# Patient Record
Sex: MALE | Race: WHITE | NOT HISPANIC OR LATINO
[De-identification: names, ages, dates, MRNs, and addresses within clinical notes are randomized per-mention and may not be internally consistent; named-entity substitution may affect disease eponyms.]

---

## 2017-01-03 ENCOUNTER — APPOINTMENT (OUTPATIENT)
Dept: ORTHOPEDIC SURGERY | Facility: CLINIC | Age: 72
End: 2017-01-03

## 2017-01-03 ENCOUNTER — OTHER (OUTPATIENT)
Age: 72
End: 2017-01-03

## 2017-01-03 VITALS
SYSTOLIC BLOOD PRESSURE: 121 MMHG | HEART RATE: 79 BPM | HEIGHT: 72 IN | BODY MASS INDEX: 25.87 KG/M2 | WEIGHT: 191 LBS | DIASTOLIC BLOOD PRESSURE: 73 MMHG

## 2017-01-03 DIAGNOSIS — M77.11 LATERAL EPICONDYLITIS, RIGHT ELBOW: ICD-10-CM

## 2017-01-06 RX ORDER — METHYLPRED ACET/NACL,ISO-OS/PF 40 MG/ML
40 VIAL (ML) INJECTION
Qty: 1 | Refills: 0 | Status: COMPLETED | OUTPATIENT
Start: 2017-01-06

## 2017-01-06 RX ORDER — LIDOCAINE HYDROCHLORIDE 10 MG/ML
1 INJECTION, SOLUTION INFILTRATION; PERINEURAL
Refills: 0 | Status: COMPLETED | OUTPATIENT
Start: 2017-01-06

## 2017-01-06 RX ADMIN — METHYLPREDNISOLONE ACETATE MG/ML: 40 INJECTION, SUSPENSION INTRA-ARTICULAR; INTRALESIONAL; INTRAMUSCULAR; SOFT TISSUE at 00:00

## 2017-01-06 RX ADMIN — LIDOCAINE HYDROCHLORIDE %: 10 INJECTION, SOLUTION INFILTRATION; PERINEURAL at 00:00

## 2017-06-12 ENCOUNTER — APPOINTMENT (OUTPATIENT)
Dept: ORTHOPEDIC SURGERY | Facility: CLINIC | Age: 72
End: 2017-06-12
Payer: MEDICARE

## 2017-06-12 VITALS
DIASTOLIC BLOOD PRESSURE: 62 MMHG | HEART RATE: 52 BPM | HEIGHT: 72 IN | WEIGHT: 197 LBS | SYSTOLIC BLOOD PRESSURE: 102 MMHG | BODY MASS INDEX: 26.68 KG/M2

## 2017-06-12 DIAGNOSIS — M54.5 LOW BACK PAIN: ICD-10-CM

## 2017-06-12 PROCEDURE — 96372 THER/PROPH/DIAG INJ SC/IM: CPT

## 2017-06-12 PROCEDURE — 72170 X-RAY EXAM OF PELVIS: CPT | Mod: 59

## 2017-06-12 PROCEDURE — 72114 X-RAY EXAM L-S SPINE BENDING: CPT

## 2017-06-12 PROCEDURE — 99214 OFFICE O/P EST MOD 30 MIN: CPT | Mod: 25

## 2017-06-12 RX ORDER — NAPROXEN SODIUM 220 MG
TABLET ORAL
Refills: 0 | Status: ACTIVE | COMMUNITY

## 2017-06-12 RX ADMIN — KETOROLAC TROMETHAMINE 0 MG/ML: 30 INJECTION, SOLUTION INTRAMUSCULAR; INTRAVENOUS at 00:00

## 2017-06-12 NOTE — HISTORY OF PRESENT ILLNESS
[Pain] : pain [Improving] : improving [7] : currently ~his/her~ pain is 7 out of 10 [Constant] : ~He/She~ states the symptoms seem to be constant [Joint Pain] : joint pain [Joint Stiffness] : joint stiffness [Joint Swelling] : joint swelling [Muscle Aches] : muscle aches [___ wks] : [unfilled] week(s) ago [All Other ROS Normal] : All other review of systems are negative except as noted [All Hx] : past medical, family, and social [All] : medication and allergy [FreeTextEntry1] : low back pain [FreeTextEntry2] : Pt presents here today for evaluation of low back pain ongoing x 10+ few years, worsening x 1 week. Pt reports no known injury. pt states pain suddenly worsened and radiates into left buttocks and hip. pt denies radiation down bilateral legs. Denies numbness, tingling, or weakness. Reports stiffness and pain to low back initially 9/10, currently 6-7/10. Taking aleve and motrin prn for relief. Stopped all meds for this 2 days ago.\par Active person - boater, fisherman, golfs. \par Current episode started a week ago when standing on his boat. Had golfed a few days prior. 40-50% better.\par Walks, occasional gym, stretches every morning.

## 2017-06-12 NOTE — PHYSICAL EXAM
[Stooped] : stooped [Limited] : is limited [Painful] : is painful [LE] : Sensory: Intact in bilateral lower extremities [1+] : left patella 1+ [2+] : left ankle jerk 2+ [DP] : dorsalis pedis 2+ and symmetric bilaterally [PT] : posterior tibial 2+ and symmetric bilaterally [Acute Distress] : not in acute distress [Obese] : not obese [Abl Mood] : in a normal mood [Abl Affect] : with normal affect [Poor Coordination] : normal coordination [Disorientation] : oriented x 3 [SLR] : negative straight leg raise [Plantar Reflex Right Only] : absent on the right [Plantar Reflex Left Only] : absent on the left [DTR Reflexes Clonus Of Right Ankle (___ Beats)] : absent on the right [DTR Reflexes Clonus Of Left Ankle (___ Beats)] : absent on the left [FreeTextEntry2] : The pt is awake, alert and oriented to self, place and time, is uncomfortable but in no acute distress. Gait examination reveals a narrow based, non-ataxic, non-antalgic gait. Can heel and toe walk without difficulty. Inspection of neck, back and lower extremities bilaterally reveals no rashes or ecchymotic lesions.  There is no obvious abnormal spinal curvature in the sagittal and coronal planes. There is no tenderness over the cervical, thoracic spine, or the upper and lower extremities musculature. Midline lumbar and paraspinal tenderness in the lumbar spine. There is no sacroiliac tenderness. No greater trochanteric tenderness bilaterally. No atrophy or abnormal movements noted in the upper or lower extremities. There is no swelling noted in the upper or lower extremities bilaterally. No cervical lymphadenopathy noted anteriorly. No joint laxity noted in the upper and lower extremity joints bilaterally.\par Hip range of motion is degrees internal rotation 30° external rotation without pain. Full range of motion of the shoulders bilaterally with no significant pain\par Negative straight leg raise to 45° in the sitting position bilaterally. There is no groin pain with hip internal rotation and a negative YUE test bilaterally.  [de-identified] : extension to 30 degrees with pain, flexion to mid tibia [de-identified] : 4 views lumbar spine obtained today demonstrate no significant scoliosis. Normal lumbar lordosis degenerative changes are seen at the thoracolumbar junction at the T12-L1 L1-2 levels. No acute fractures. No dynamic instability between flexion and extension.\par \par AP pelvis demonstrates normal appearance of the hips bilaterally. No significant degenerative changes.

## 2017-06-12 NOTE — CONSULT LETTER
[Dear  ___] : Dear  [unfilled], [I had the pleasure of evaluating your patient, [unfilled].] : I had the pleasure of evaluating your patient, [unfilled]. [FreeTextEntry2] : Sebastien Saez [FreeTextEntry1] : Thank you for this referral. I have enclosed my note for your review. Please feel free to contact my office if you have additional questions regarding this patient.\par \par Regards,\par Bo Richards MD, FACS, FAAOS\par \par  of Orthopaedic Surgery\par [facility] [employer]\par Spinal Reconstruction Surgery\par Minimally Invasive Spinal Surgery\par [facility]

## 2017-06-12 NOTE — DISCUSSION/SUMMARY
[Medication Risks Reviewed] : Medication risks reviewed [de-identified] : The patient reports onset of low back pain bilaterally with some radiation into his left buttock. He has denied having severe pain like this in the past. His symptoms are suggestive of acute lumbar spasm associated with annular tear and inflammation of his lumbar spine. Mild degenerative changes are noted on x-rays. He is an active golfer and fisherman. I recommended strongly that he start a course of physical therapy as well as bursula self-directed home exercise program or exercise at the gym with swimming and yoga. Prescribed him tizanidine as a muscle relaxant for symptomatic relief. I will see him back in to 4 weeks an as-needed basis for his symptoms. If his symptoms persist additional imaging studies including an MRI can be considered.\par \par The patient has reported significant pain and would like an injection of Toradol today. Under sterile conditions 30 mg of Toradol was administered intramuscularly by the LPN with the response. \par \par The patient was educated regarding their condition, treatment options as well as prescribed course of treatment. \par Risks and benefits as well as alternatives to the proposed treatment were also provided to the patient \par They were given the opportunity to have all their questions answered to their satisfaction.\par \par Vital signs were reviewed with the patient and the patient was instructed to followup with their primary care provider for further management.\par \par Healthy lifestyle recommendations were also made including a tobacco free lifestyle, proper diet, and weight control.

## 2018-12-12 ENCOUNTER — APPOINTMENT (OUTPATIENT)
Dept: ORTHOPEDIC SURGERY | Facility: CLINIC | Age: 73
End: 2018-12-12
Payer: MEDICARE

## 2018-12-12 VITALS
DIASTOLIC BLOOD PRESSURE: 70 MMHG | WEIGHT: 198 LBS | HEIGHT: 72 IN | HEART RATE: 56 BPM | BODY MASS INDEX: 26.82 KG/M2 | SYSTOLIC BLOOD PRESSURE: 110 MMHG

## 2018-12-12 PROCEDURE — 73564 X-RAY EXAM KNEE 4 OR MORE: CPT | Mod: RT

## 2018-12-12 PROCEDURE — 99215 OFFICE O/P EST HI 40 MIN: CPT | Mod: 25

## 2018-12-12 PROCEDURE — 20610 DRAIN/INJ JOINT/BURSA W/O US: CPT | Mod: RT

## 2018-12-28 RX ORDER — LIDOCAINE HYDROCHLORIDE 10 MG/ML
1 INJECTION, SOLUTION INFILTRATION; PERINEURAL
Qty: 0 | Refills: 0 | Status: COMPLETED | OUTPATIENT
Start: 2018-12-28

## 2018-12-28 RX ORDER — METHYLPRED ACET/NACL,ISO-OS/PF 40 MG/ML
40 VIAL (ML) INJECTION
Qty: 1 | Refills: 0 | Status: COMPLETED | OUTPATIENT
Start: 2018-12-28

## 2018-12-28 RX ADMIN — LIDOCAINE HYDROCHLORIDE %: 10 INJECTION, SOLUTION INFILTRATION; PERINEURAL at 00:00

## 2018-12-28 RX ADMIN — Medication MG/ML: at 00:00

## 2019-06-17 ENCOUNTER — APPOINTMENT (OUTPATIENT)
Dept: ORTHOPEDIC SURGERY | Facility: CLINIC | Age: 74
End: 2019-06-17
Payer: MEDICARE

## 2019-06-17 VITALS
DIASTOLIC BLOOD PRESSURE: 68 MMHG | HEIGHT: 72 IN | HEART RATE: 45 BPM | BODY MASS INDEX: 24.65 KG/M2 | WEIGHT: 182 LBS | SYSTOLIC BLOOD PRESSURE: 113 MMHG

## 2019-06-17 PROCEDURE — 72170 X-RAY EXAM OF PELVIS: CPT | Mod: 59

## 2019-06-17 PROCEDURE — 72110 X-RAY EXAM L-2 SPINE 4/>VWS: CPT

## 2019-06-17 PROCEDURE — 99214 OFFICE O/P EST MOD 30 MIN: CPT

## 2019-06-17 RX ORDER — IBUPROFEN 800 MG/1
TABLET, FILM COATED ORAL
Refills: 0 | Status: DISCONTINUED | COMMUNITY
End: 2019-06-17

## 2019-06-17 RX ORDER — TIZANIDINE 4 MG/1
4 TABLET ORAL 3 TIMES DAILY
Qty: 50 | Refills: 0 | Status: DISCONTINUED | COMMUNITY
Start: 2017-06-12 | End: 2019-06-17

## 2019-06-17 NOTE — PHYSICAL EXAM
[Painful] : is painful [Limited] : is limited [LE] : Sensory: Intact in bilateral lower extremities [1+] : left patella 1+ [2+] : right ankle jerk 2+ [DP] : dorsalis pedis 2+ and symmetric bilaterally [PT] : posterior tibial 2+ and symmetric bilaterally [Acute Distress] : not in acute distress [Obese] : not obese [Abl Affect] : with normal affect [Abl Mood] : in a normal mood [Poor Coordination] : normal coordination [Disorientation] : oriented x 3 [SLR] : negative straight leg raise [Plantar Reflex Right Only] : absent on the right [DTR Reflexes Clonus Of Left Ankle (___ Beats)] : absent on the left [Plantar Reflex Left Only] : absent on the left [DTR Reflexes Clonus Of Right Ankle (___ Beats)] : absent on the right [FreeTextEntry2] : The pt is awake, alert and oriented to self, place and time, is uncomfortable but in no acute distress. Gait examination reveals a narrow based, non-ataxic, non-antalgic gait. Can heel and toe walk without difficulty. Inspection of neck, back and lower extremities bilaterally reveals no rashes or ecchymotic lesions.  There is no obvious abnormal spinal curvature in the sagittal and coronal planes. There is no tenderness over the cervical, thoracic spine, or the upper and lower extremities musculature. Midline lumbar and paraspinal tenderness in the lumbar spine. There is no sacroiliac tenderness. No greater trochanteric tenderness bilaterally. No atrophy or abnormal movements noted in the upper or lower extremities. There is no swelling noted in the upper or lower extremities bilaterally. No cervical lymphadenopathy noted anteriorly. No joint laxity noted in the upper and lower extremity joints bilaterally.\par Hip range of motion is degrees internal rotation 30° external rotation without pain. Full range of motion of the shoulders bilaterally with no significant pain\par Negative straight leg raise to 45° in the sitting position bilaterally. There is no groin pain with hip internal rotation and a negative YUE test bilaterally.  [de-identified] : extension to 30 degrees with pain, flexion to mid tibia [de-identified] : left sciatic notch tenderness [de-identified] : 4 views lumbar spine obtained today demonstrate no significant scoliosis. Normal lumbar lordosis degenerative changes are seen at the thoracolumbar junction at the T12-L1 L1-2 levels. No acute fractures. No dynamic instability between flexion and extension.\par \par AP pelvis demonstrates normal appearance of the hips bilaterally. No significant degenerative changes.

## 2019-06-17 NOTE — HISTORY OF PRESENT ILLNESS
[Worsening] : worsening [8] : a current pain level of 8/10 [None] : No relieving factors are noted [Daily] : ~He/She~ states the symptoms seem to be occuring daily [___ mths] : [unfilled] month(s) ago [de-identified] : Patient is here today for evaluation on his left buttock into left hip pain going on for the past several months. Patient states he is a side sleeper and is unable to lie on his left buttock to sleep any longer without having pain. ~3 months.\par Plays tennis, pickle ball and golf.  [de-identified] : sleeping lifting left leg

## 2019-06-17 NOTE — DISCUSSION/SUMMARY
[Medication Risks Reviewed] : Medication risks reviewed [de-identified] : The patient has symptoms consistent with lumbar radiculopathy. He has known degenerative changes in lumbar spine. I have recommended an MRI of her lumbar spine for further evaluation. Further treatment options may include lumbar epidural steroid injections based on MRI findings.Trial of gabapentin prescribed.\par \par The patient was educated regarding their condition, treatment options as well as prescribed course of treatment. \par Risks and benefits as well as alternatives to the proposed treatment were also provided to the patient \par They were given the opportunity to have all their questions answered to their satisfaction.\par \par Vital signs were reviewed with the patient and the patient was instructed to followup with their primary care provider for further management.\par \par Healthy lifestyle recommendations were also made including a tobacco free lifestyle, proper diet, and weight control.

## 2019-06-18 ENCOUNTER — APPOINTMENT (OUTPATIENT)
Dept: MRI IMAGING | Facility: CLINIC | Age: 74
End: 2019-06-18
Payer: MEDICARE

## 2019-06-18 ENCOUNTER — OUTPATIENT (OUTPATIENT)
Dept: OUTPATIENT SERVICES | Facility: HOSPITAL | Age: 74
LOS: 1 days | End: 2019-06-18
Payer: MEDICARE

## 2019-06-18 DIAGNOSIS — M51.36 OTHER INTERVERTEBRAL DISC DEGENERATION, LUMBAR REGION: ICD-10-CM

## 2019-06-18 DIAGNOSIS — M54.16 RADICULOPATHY, LUMBAR REGION: ICD-10-CM

## 2019-06-18 DIAGNOSIS — M51.26 OTHER INTERVERTEBRAL DISC DISPLACEMENT, LUMBAR REGION: ICD-10-CM

## 2019-06-18 DIAGNOSIS — M51.37 OTHER INTERVERTEBRAL DISC DEGENERATION, LUMBOSACRAL REGION: ICD-10-CM

## 2019-06-18 PROCEDURE — 72148 MRI LUMBAR SPINE W/O DYE: CPT

## 2019-06-18 PROCEDURE — 72148 MRI LUMBAR SPINE W/O DYE: CPT | Mod: 26

## 2019-06-26 ENCOUNTER — APPOINTMENT (OUTPATIENT)
Dept: ORTHOPEDIC SURGERY | Facility: CLINIC | Age: 74
End: 2019-06-26
Payer: MEDICARE

## 2019-06-26 VITALS
HEART RATE: 60 BPM | HEIGHT: 72 IN | BODY MASS INDEX: 24.52 KG/M2 | SYSTOLIC BLOOD PRESSURE: 113 MMHG | WEIGHT: 181 LBS | DIASTOLIC BLOOD PRESSURE: 74 MMHG

## 2019-06-26 DIAGNOSIS — M51.26 OTHER INTERVERTEBRAL DISC DISPLACEMENT, LUMBAR REGION: ICD-10-CM

## 2019-06-26 DIAGNOSIS — M51.37 OTHER INTERVERTEBRAL DISC DEGENERATION, LUMBOSACRAL REGION: ICD-10-CM

## 2019-06-26 PROCEDURE — 99214 OFFICE O/P EST MOD 30 MIN: CPT

## 2019-06-26 NOTE — PHYSICAL EXAM
[Limited] : is limited [Painful] : is painful [LE] : Sensory: Intact in bilateral lower extremities [1+] : left patella 1+ [2+] : left ankle jerk 2+ [DP] : dorsalis pedis 2+ and symmetric bilaterally [PT] : posterior tibial 2+ and symmetric bilaterally [Acute Distress] : not in acute distress [de-identified] : EXAM: MR SPINE LUMBAR \par \par PROCEDURE DATE: 06/18/2019 \par \par INTERPRETATION: CLINICAL INDICATION: Left leg pain. \par \par Multiplanar Multisequence MR of the LUMBAR SPINE \par \par Prior Studies: None. \par \par FINDINGS: \par \par ALIGNMENT: There is exaggeration of lumbar lordosis. There is mild \par retrolisthesis of L1 on L2. There is mild levoscoliosis. \par \par VERTEBRAL BODIES: No acute compression deformity. \par \par DISC SPACES: There is multilevel disc desiccation and multilevel moderate \par disc height loss. Vacuum disc phenomenon is seen at T11/T12, T12/L1, and \par L1/L2. Chronic Schmorl's node formation is noted at multiple levels. \par \par MARROW: Prominent edematous endplate changes are seen at L4/L5 and to a \par lesser extent L2/L3, L1/L2, T12/L1, and T11/T12. \par \par SACROILIAC JOINTS: There is mild bilateral sacroiliac joint arthrosis. \par \par CONUS AND CAUDA EQUINA: Conus is normal in morphology terminating at the \par level of L1 \par \par IMAGED ABDOMINAL AND PELVIC STRUCTURES: Partially imaged left-sided renal \par cysts are noted. \par \par The findings at the individual levels are as follows: \par \par T12/L1: This level is imaged only in the sagittal plane. There is a minimal \par disc bulge. There is no spinal canal or neural foraminal narrowing. \par \par L1/2: There is a minimal disc bulge with posterior osseous ridging. There is \par mild bilateral neural foraminal narrowing. There is mild flattening of \par ventral thecal sac. \par \par L2/3: There is a mild diffuse disc bulge. There is bilateral facet arthrosis \par and ligamentum flavum hypertrophy. There is a small right facet joint \par effusion with fluid decompressing into the right posterior paraspinal \par musculature. Findings result in bilateral neural foraminal narrowing, \par moderate on the right and mild on the left. There is mild spinal canal \par narrowing. \par \par L3/4: There is a mild diffuse disc bulge with bilateral facet arthrosis. \par Findings result in mild bilateral neural foraminal narrowing. There is mild \par spinal canal narrowing. \par \par L4/5: There is a diffuse disc bulge with posterior osseous ridging and a \par superimposed left foraminal disc protrusion. There is bilateral facet \par arthrosis. Findings result in mild to moderate spinal canal narrowing, most \par prominent along the lateral recesses bilaterally. There is bilateral neural \par foraminal narrowing, moderate to severe on the left and mild on the right. \par \par L5/S1: There is a diffuse disc bulge with a superimposed central disc \par protrusion. There is bilateral facet arthrosis. There is a small right facet \par joint effusion. Findings result in bilateral neural foraminal narrowing, \par moderate on the left and mild on the right. There is no spinal canal \par narrowing. \par \par IMPRESSION: \par \par Multilevel lumbar spondylosis with exaggeration of the lumbar lordosis and \par trace retrolisthesis of L1 on L2 and L2 on L3. \par \par L2/3: There is moderate right neural foraminal narrowing. \par \par L4/5: There is mild to moderate spinal canal narrowing, most prominent along \par the lateral recesses bilaterally. There is moderate to severe left neural \par foraminal narrowing. \par \par L5/S1: There is moderate left neural foraminal narrowing. \par \par CAREN CASTELLANOS M.D., ATTENDING RADIOLOGIST \par This document has been electronically signed. Jun 20 2019 10:05AM  [Obese] : not obese [Abl Mood] : in a normal mood [Abl Affect] : with normal affect [Disorientation] : oriented x 3 [Poor Coordination] : normal coordination [SLR] : negative straight leg raise [Plantar Reflex Right Only] : absent on the right [Plantar Reflex Left Only] : absent on the left [DTR Reflexes Clonus Of Left Ankle (___ Beats)] : absent on the left [DTR Reflexes Clonus Of Right Ankle (___ Beats)] : absent on the right [FreeTextEntry2] : The pt is awake, alert and oriented to self, place and time, is uncomfortable but in no acute distress. Gait examination reveals a narrow based, non-ataxic, non-antalgic gait. Can heel and toe walk without difficulty. Inspection of neck, back and lower extremities bilaterally reveals no rashes or ecchymotic lesions.  There is no obvious abnormal spinal curvature in the sagittal and coronal planes. There is no tenderness over the cervical, thoracic spine, or the upper and lower extremities musculature. Midline lumbar and paraspinal tenderness in the lumbar spine. There is no sacroiliac tenderness. No greater trochanteric tenderness bilaterally. No atrophy or abnormal movements noted in the upper or lower extremities. There is no swelling noted in the upper or lower extremities bilaterally. No cervical lymphadenopathy noted anteriorly. No joint laxity noted in the upper and lower extremity joints bilaterally.\par Hip range of motion is degrees internal rotation 30° external rotation without pain. Full range of motion of the shoulders bilaterally with no significant pain\par Negative straight leg raise to 45° in the sitting position bilaterally. There is no groin pain with hip internal rotation and a negative YUE test bilaterally.  [de-identified] : left sciatic notch tenderness [de-identified] : extension to 30 degrees with pain, flexion to mid tibia

## 2019-06-26 NOTE — HISTORY OF PRESENT ILLNESS
[Daily] : ~He/She~ states the symptoms seem to be occuring daily [None] : No relieving factors are noted [Stable] : stable [___ mths] : [unfilled] month(s) ago [de-identified] : Patient is here today to review mri lumbar spine 6/18/19. No changes since last office visit 6/17/19 [de-identified] : lifting left leg sleeping

## 2019-06-26 NOTE — DISCUSSION/SUMMARY
[Medication Risks Reviewed] : Medication risks reviewed [de-identified] : The patient has disc protrusion on the left side at L5-S1 into lesser extent L4-5 with symptoms likely corresponding to this pathology. Recommended that he try a course of lumbar epidural steroid injections and he would like to proceed. He will continue gabapentin as tolerated though he is not sure if that helped him very much. He understands if his symptoms do not improve a limited decompression left side L4-5 and L5-S1 with discectomy would be considered.\par \par The patient was educated regarding their condition, treatment options as well as prescribed course of treatment. \par Risks and benefits as well as alternatives to the proposed treatment were also provided to the patient \par They were given the opportunity to have all their questions answered to their satisfaction.\par \par Vital signs were reviewed with the patient and the patient was instructed to followup with their primary care provider for further management.\par \par Healthy lifestyle recommendations were also made including a tobacco free lifestyle, proper diet, and weight control.

## 2019-06-26 NOTE — REASON FOR VISIT
[Follow-Up Visit] : a follow-up visit for [Degenerative Joint Disease] : degenerative joint disease [Herniated Discs] : herniated discs [Back Pain] : back pain [Radiculopathy] : radiculopathy

## 2019-07-05 ENCOUNTER — APPOINTMENT (OUTPATIENT)
Dept: ORTHOPEDIC SURGERY | Facility: HOSPITAL | Age: 74
End: 2019-07-05

## 2019-07-05 ENCOUNTER — OUTPATIENT (OUTPATIENT)
Dept: OUTPATIENT SERVICES | Facility: HOSPITAL | Age: 74
LOS: 1 days | End: 2019-07-05
Payer: MEDICARE

## 2019-07-05 DIAGNOSIS — M54.16 RADICULOPATHY, LUMBAR REGION: ICD-10-CM

## 2019-07-05 DIAGNOSIS — M51.37 OTHER INTERVERTEBRAL DISC DEGENERATION, LUMBOSACRAL REGION: ICD-10-CM

## 2019-07-05 DIAGNOSIS — M51.26 OTHER INTERVERTEBRAL DISC DISPLACEMENT, LUMBAR REGION: ICD-10-CM

## 2019-07-05 PROCEDURE — 64483 NJX AA&/STRD TFRM EPI L/S 1: CPT | Mod: LT

## 2019-07-05 PROCEDURE — 64484 NJX AA&/STRD TFRM EPI L/S EA: CPT | Mod: LT

## 2019-07-05 PROCEDURE — 77003 FLUOROGUIDE FOR SPINE INJECT: CPT

## 2019-07-15 ENCOUNTER — RX RENEWAL (OUTPATIENT)
Age: 74
End: 2019-07-15

## 2019-07-22 ENCOUNTER — APPOINTMENT (OUTPATIENT)
Dept: ORTHOPEDIC SURGERY | Facility: CLINIC | Age: 74
End: 2019-07-22
Payer: MEDICARE

## 2019-07-22 VITALS
HEIGHT: 72 IN | DIASTOLIC BLOOD PRESSURE: 70 MMHG | SYSTOLIC BLOOD PRESSURE: 111 MMHG | BODY MASS INDEX: 24.38 KG/M2 | HEART RATE: 50 BPM | WEIGHT: 180 LBS

## 2019-07-22 DIAGNOSIS — M51.36 OTHER INTERVERTEBRAL DISC DEGENERATION, LUMBAR REGION: ICD-10-CM

## 2019-07-22 DIAGNOSIS — M54.16 RADICULOPATHY, LUMBAR REGION: ICD-10-CM

## 2019-07-22 PROCEDURE — 99214 OFFICE O/P EST MOD 30 MIN: CPT

## 2019-07-22 RX ORDER — CHLORHEXIDINE GLUCONATE, 0.12% ORAL RINSE 1.2 MG/ML
0.12 SOLUTION DENTAL
Qty: 473 | Refills: 0 | Status: ACTIVE | COMMUNITY
Start: 2019-05-22

## 2019-07-22 RX ORDER — GABAPENTIN 300 MG/1
300 CAPSULE ORAL
Qty: 30 | Refills: 0 | Status: DISCONTINUED | COMMUNITY
Start: 2019-06-17 | End: 2019-07-22

## 2019-07-22 NOTE — HISTORY OF PRESENT ILLNESS
[3] : a current pain level of 3/10 [Prolonged Sitting] : worsened by prolonged sitting [Improving] : improving [de-identified] : Patient is here today 2 weeks post lumbar epidural injection left L4, L5 7/5/19. Overall patient is feeling 75% better. Does own exercises. Not interested in PT. [de-identified] : prolong sitting then getting up to ambulate [de-identified] : lumbar epidural injection

## 2019-07-22 NOTE — PHYSICAL EXAM
[Normal] : normal [Limited] : is limited [LE] : Sensory: Intact in bilateral lower extremities [1+] : left patella 1+ [2+] : right ankle jerk 2+ [DP] : dorsalis pedis 2+ and symmetric bilaterally [PT] : posterior tibial 2+ and symmetric bilaterally [Obese] : not obese [Acute Distress] : not in acute distress [Abl Mood] : in a normal mood [Abl Affect] : with normal affect [Poor Coordination] : normal coordination [Disorientation] : oriented x 3 [Painful] : not painful [SLR] : negative straight leg raise [Plantar Reflex Right Only] : absent on the right [Plantar Reflex Left Only] : absent on the left [DTR Reflexes Clonus Of Left Ankle (___ Beats)] : absent on the left [DTR Reflexes Clonus Of Right Ankle (___ Beats)] : absent on the right [FreeTextEntry2] : The pt is awake, alert and oriented to self, place and time, is uncomfortable but in no acute distress. Gait examination reveals a narrow based, non-ataxic, non-antalgic gait. Can heel and toe walk without difficulty. Inspection of neck, back and lower extremities bilaterally reveals no rashes or ecchymotic lesions.  There is no obvious abnormal spinal curvature in the sagittal and coronal planes. There is no tenderness over the cervical, thoracic spine, or the upper and lower extremities musculature. Midline lumbar and paraspinal tenderness in the lumbar spine. There is no sacroiliac tenderness. No greater trochanteric tenderness bilaterally. No atrophy or abnormal movements noted in the upper or lower extremities. There is no swelling noted in the upper or lower extremities bilaterally. No cervical lymphadenopathy noted anteriorly. No joint laxity noted in the upper and lower extremity joints bilaterally.\par Hip range of motion is degrees internal rotation 30° external rotation without pain. Full range of motion of the shoulders bilaterally with no significant pain\par Negative straight leg raise to 45° in the sitting position bilaterally. There is no groin pain with hip internal rotation and a negative YUE test bilaterally.  [de-identified] : left sciatic notch tenderness is improved [de-identified] : extension to 30 degrees with pain, flexion to mid tibia

## 2019-07-22 NOTE — DISCUSSION/SUMMARY
[Medication Risks Reviewed] : Medication risks reviewed [de-identified] : At this point the patient reports more than 75% improvement of his back and left leg pain. He is not interested in formal physical therapy. Recommend use of over-the-counter NSAIDs on an as-needed basis. I recommended exercise program as provided to him today. If he has increasing pain and left epidural steroid injection at L4 and L5 can be considered again. He will followup with me on an as-needed basis.\par \par The patient was educated regarding their condition, treatment options as well as prescribed course of treatment. \par Risks and benefits as well as alternatives to the proposed treatment were also provided to the patient \par They were given the opportunity to have all their questions answered to their satisfaction.\par \par Vital signs were reviewed with the patient and the patient was instructed to followup with their primary care provider for further management.\par \par Healthy lifestyle recommendations were also made including a tobacco free lifestyle, proper diet, and weight control.

## 2019-08-16 ENCOUNTER — OUTPATIENT (OUTPATIENT)
Dept: OUTPATIENT SERVICES | Facility: HOSPITAL | Age: 74
LOS: 1 days | End: 2019-08-16
Payer: MEDICARE

## 2019-08-16 ENCOUNTER — APPOINTMENT (OUTPATIENT)
Dept: ORTHOPEDIC SURGERY | Facility: HOSPITAL | Age: 74
End: 2019-08-16

## 2019-08-16 DIAGNOSIS — M51.36 OTHER INTERVERTEBRAL DISC DEGENERATION, LUMBAR REGION: ICD-10-CM

## 2019-08-16 DIAGNOSIS — M54.16 RADICULOPATHY, LUMBAR REGION: ICD-10-CM

## 2019-08-16 PROCEDURE — 64484 NJX AA&/STRD TFRM EPI L/S EA: CPT | Mod: LT

## 2019-08-16 PROCEDURE — 77003 FLUOROGUIDE FOR SPINE INJECT: CPT

## 2019-08-16 PROCEDURE — 64483 NJX AA&/STRD TFRM EPI L/S 1: CPT | Mod: LT

## 2019-10-21 ENCOUNTER — APPOINTMENT (OUTPATIENT)
Dept: ORTHOPEDIC SURGERY | Facility: CLINIC | Age: 74
End: 2019-10-21

## 2020-07-01 ENCOUNTER — APPOINTMENT (OUTPATIENT)
Dept: ORTHOPEDIC SURGERY | Facility: CLINIC | Age: 75
End: 2020-07-01
Payer: MEDICARE

## 2020-07-01 VITALS — TEMPERATURE: 97.9 F | HEART RATE: 64 BPM | SYSTOLIC BLOOD PRESSURE: 120 MMHG | DIASTOLIC BLOOD PRESSURE: 68 MMHG

## 2020-07-01 DIAGNOSIS — M17.11 UNILATERAL PRIMARY OSTEOARTHRITIS, RIGHT KNEE: ICD-10-CM

## 2020-07-01 PROCEDURE — 73564 X-RAY EXAM KNEE 4 OR MORE: CPT | Mod: RT

## 2020-07-01 PROCEDURE — 99215 OFFICE O/P EST HI 40 MIN: CPT | Mod: 25

## 2020-07-01 PROCEDURE — 20610 DRAIN/INJ JOINT/BURSA W/O US: CPT | Mod: RT

## 2020-07-01 RX ORDER — HYALURONATE SOD, CROSS-LINKED 30 MG/3 ML
30 SYRINGE (ML) INTRAARTICULAR
Refills: 0 | Status: COMPLETED | OUTPATIENT
Start: 2020-07-01

## 2020-07-01 RX ADMIN — Medication 0 MG/3ML: at 00:00

## 2020-07-15 ENCOUNTER — APPOINTMENT (OUTPATIENT)
Dept: ORTHOPEDIC SURGERY | Facility: CLINIC | Age: 75
End: 2020-07-15